# Patient Record
Sex: MALE | URBAN - METROPOLITAN AREA
[De-identification: names, ages, dates, MRNs, and addresses within clinical notes are randomized per-mention and may not be internally consistent; named-entity substitution may affect disease eponyms.]

---

## 2020-12-15 ENCOUNTER — WEB ENCOUNTER (OUTPATIENT)
Dept: URBAN - METROPOLITAN AREA CLINIC 80 | Facility: CLINIC | Age: 65
End: 2020-12-15

## 2020-12-15 ENCOUNTER — OFFICE VISIT (OUTPATIENT)
Dept: URBAN - METROPOLITAN AREA CLINIC 80 | Facility: CLINIC | Age: 65
End: 2020-12-15
Payer: COMMERCIAL

## 2020-12-15 DIAGNOSIS — Z86.19 HX OF HEPATITIS C: ICD-10-CM

## 2020-12-15 DIAGNOSIS — R07.89 OTHER CHEST PAIN: ICD-10-CM

## 2020-12-15 DIAGNOSIS — R79.89 ELEVATED LFTS: ICD-10-CM

## 2020-12-15 PROCEDURE — G8420 CALC BMI NORM PARAMETERS: HCPCS | Performed by: INTERNAL MEDICINE

## 2020-12-15 PROCEDURE — 4004F PT TOBACCO SCREEN RCVD TLK: CPT | Performed by: INTERNAL MEDICINE

## 2020-12-15 PROCEDURE — 99204 OFFICE O/P NEW MOD 45 MIN: CPT | Performed by: INTERNAL MEDICINE

## 2020-12-15 PROCEDURE — G9902 PT SCRN TBCO AND ID AS USER: HCPCS | Performed by: INTERNAL MEDICINE

## 2020-12-15 PROCEDURE — G9906 PT RECV TBCO CESS INTERV: HCPCS | Performed by: INTERNAL MEDICINE

## 2020-12-15 PROCEDURE — G8427 DOCREV CUR MEDS BY ELIG CLIN: HCPCS | Performed by: INTERNAL MEDICINE

## 2020-12-15 RX ORDER — EZETIMIBE 10 MG
1 TABLET TABLET ORAL ONCE A DAY
Status: ACTIVE | COMMUNITY

## 2020-12-15 NOTE — HPI-TODAY'S VISIT:
Very pleasant 65 yr old male with chest pain and  hx of hepatitis C treated with pegasys here for evaluation. He was hospitalized with chest pain the end of November. He had negative cardiac history so he was referred for GI evaluation. The pain began after he ate and was worse when he laid down on his side.  He denies typical GERD symptoms. Denies dysphagia or weight loss.

## 2020-12-19 LAB
ANTI-CENTROMERE B ANTIBODIES: <0.2
ANTI-DNA (DS) AB QN: 9
ANTI-JO-1: <0.2
ANTICHROMATIN ANTIBODIES: <0.2
ANTISCLERODERMA-70 ANTIBODIES: <0.2
HBSAG SCREEN: NEGATIVE
HCV LOG10: 6.38
HEP B CORE AB, IGM: NEGATIVE
HEP BE AG: NEGATIVE
HEPATITIS C GENOTYPE: (no result)
HEPATITIS C QUANTITATION: (no result)
Lab: (no result)
MITOCHONDRIAL (M2) ANTIBODY: <20
RNP ANTIBODIES: 0.2
SJOGREN'S ANTI-SS-A: <0.2
SJOGREN'S ANTI-SS-B: <0.2
SMITH ANTIBODIES: <0.2
TEST INFORMATION:: (no result)

## 2020-12-22 ENCOUNTER — TELEPHONE ENCOUNTER (OUTPATIENT)
Dept: URBAN - METROPOLITAN AREA CLINIC 92 | Facility: CLINIC | Age: 65
End: 2020-12-22

## 2020-12-22 ENCOUNTER — OFFICE VISIT (OUTPATIENT)
Dept: URBAN - METROPOLITAN AREA SURGERY CENTER 19 | Facility: SURGERY CENTER | Age: 65
End: 2020-12-22
Payer: COMMERCIAL

## 2020-12-22 DIAGNOSIS — K29.50 GASTRITIS, CHRONIC: ICD-10-CM

## 2020-12-22 PROCEDURE — 43239 EGD BIOPSY SINGLE/MULTIPLE: CPT | Performed by: INTERNAL MEDICINE

## 2020-12-22 RX ORDER — EZETIMIBE 10 MG
1 TABLET TABLET ORAL ONCE A DAY
Status: ACTIVE | COMMUNITY

## 2020-12-30 ENCOUNTER — OFFICE VISIT (OUTPATIENT)
Dept: URBAN - METROPOLITAN AREA CLINIC 79 | Facility: CLINIC | Age: 65
End: 2020-12-30
Payer: COMMERCIAL

## 2020-12-30 DIAGNOSIS — R74.8 ABNORMAL LIVER ENZYMES: ICD-10-CM

## 2020-12-30 DIAGNOSIS — N28.1 CYST OF RIGHT KIDNEY: ICD-10-CM

## 2020-12-30 PROCEDURE — 76705 ECHO EXAM OF ABDOMEN: CPT | Performed by: INTERNAL MEDICINE

## 2020-12-30 RX ORDER — EZETIMIBE 10 MG
1 TABLET TABLET ORAL ONCE A DAY
Status: ACTIVE | COMMUNITY

## 2021-01-08 ENCOUNTER — TELEPHONE ENCOUNTER (OUTPATIENT)
Dept: URBAN - METROPOLITAN AREA CLINIC 80 | Facility: CLINIC | Age: 66
End: 2021-01-08

## 2021-01-26 ENCOUNTER — OFFICE VISIT (OUTPATIENT)
Dept: URBAN - METROPOLITAN AREA CLINIC 80 | Facility: CLINIC | Age: 66
End: 2021-01-26

## 2021-02-10 ENCOUNTER — OFFICE VISIT (OUTPATIENT)
Dept: URBAN - METROPOLITAN AREA CLINIC 126 | Facility: CLINIC | Age: 66
End: 2021-02-10
Payer: COMMERCIAL

## 2021-02-10 DIAGNOSIS — K21.00 GASTROESOPHAGEAL REFLUX DISEASE WITH ESOPHAGITIS WITHOUT HEMORRHAGE: ICD-10-CM

## 2021-02-10 DIAGNOSIS — K70.30 ALCOHOLIC CIRRHOSIS OF LIVER WITHOUT ASCITES: ICD-10-CM

## 2021-02-10 DIAGNOSIS — B18.2 CHRONIC HEPATITIS C WITHOUT HEPATIC COMA: ICD-10-CM

## 2021-02-10 PROBLEM — 420054005: Status: ACTIVE | Noted: 2021-02-10

## 2021-02-10 PROCEDURE — 99442 PHONE E/M BY PHYS 11-20 MIN: CPT | Performed by: NURSE PRACTITIONER

## 2021-02-10 RX ORDER — EZETIMIBE 10 MG
1 TABLET TABLET ORAL ONCE A DAY
COMMUNITY

## 2021-02-10 RX ORDER — GLECAPREVIR AND PIBRENTASVIR 40; 100 MG/1; MG/1
3 TABLETS TABLET, FILM COATED ORAL ONCE A DAY
Qty: 252 TABLET | Refills: 0 | OUTPATIENT
Start: 2021-02-10 | End: 2021-05-05

## 2021-02-10 NOTE — HPI-TODAY'S VISIT:
Very pleasant 65 yr old seen in follow up today for chest pain and hepatitis C. He had EGD 12/20 notable for reflux esophagitis, no Figueredo's, medeium hiatal hernia, chronic gastritis-no Hpyori. He also returns today to discuss treatment for hepatitis C. He was diagnosed many years ago and was previously treated with Pegasys. At his last visit, labs indicated high viral load and genotype 1a. US with likely cirrhosis. He needs to discuss with PCP to get immunized for Hep A and B.

## 2021-03-08 ENCOUNTER — TELEPHONE ENCOUNTER (OUTPATIENT)
Dept: URBAN - METROPOLITAN AREA CLINIC 2 | Facility: CLINIC | Age: 66
End: 2021-03-08

## 2021-03-23 ENCOUNTER — TELEPHONE ENCOUNTER (OUTPATIENT)
Dept: URBAN - METROPOLITAN AREA CLINIC 2 | Facility: CLINIC | Age: 66
End: 2021-03-23

## 2021-03-23 RX ORDER — LEDIPASVIR AND SOFOSBUVIR 90; 400 MG/1; MG/1
1 TABLET TABLET, FILM COATED ORAL ONCE A DAY
Qty: 84 TABLET | Refills: 0 | OUTPATIENT
Start: 2021-03-24 | End: 2021-06-16

## 2021-03-23 RX ORDER — GLECAPREVIR AND PIBRENTASVIR 40; 100 MG/1; MG/1
3 TABLETS TABLET, FILM COATED ORAL ONCE A DAY
Qty: 252 TABLET | Refills: 0 | Status: ACTIVE | COMMUNITY
Start: 2021-02-10 | End: 2021-05-05

## 2021-03-23 RX ORDER — EZETIMIBE 10 MG
1 TABLET TABLET ORAL ONCE A DAY
COMMUNITY

## 2021-03-24 PROBLEM — 831000119103: Status: ACTIVE | Noted: 2021-03-24

## 2021-03-24 PROBLEM — 19943007: Status: ACTIVE | Noted: 2021-03-24

## 2021-04-02 ENCOUNTER — TELEPHONE ENCOUNTER (OUTPATIENT)
Dept: URBAN - METROPOLITAN AREA CLINIC 126 | Facility: CLINIC | Age: 66
End: 2021-04-02

## 2021-04-08 ENCOUNTER — TELEPHONE ENCOUNTER (OUTPATIENT)
Dept: URBAN - METROPOLITAN AREA CLINIC 2 | Facility: CLINIC | Age: 66
End: 2021-04-08

## 2021-05-04 ENCOUNTER — TELEPHONE ENCOUNTER (OUTPATIENT)
Dept: URBAN - METROPOLITAN AREA CLINIC 23 | Facility: CLINIC | Age: 66
End: 2021-05-04

## 2021-05-05 ENCOUNTER — TELEPHONE ENCOUNTER (OUTPATIENT)
Dept: URBAN - METROPOLITAN AREA CLINIC 92 | Facility: CLINIC | Age: 66
End: 2021-05-05

## 2021-05-05 PROBLEM — 128302006: Status: ACTIVE | Noted: 2021-05-05

## 2021-05-05 RX ORDER — RIBAVIRIN 200 MG/1
3 TABLETS TABLET, FILM COATED ORAL ONCE DAILY
Qty: 168 TABLET | Refills: 0 | OUTPATIENT
Start: 2021-05-05 | End: 2021-06-30

## 2021-05-05 RX ORDER — GLECAPREVIR AND PIBRENTASVIR 40; 100 MG/1; MG/1
3 TABLETS TABLET, FILM COATED ORAL ONCE A DAY
Qty: 252 TABLET | Refills: 0 | Status: ACTIVE | COMMUNITY
Start: 2021-02-10 | End: 2021-05-05

## 2021-05-05 RX ORDER — EZETIMIBE 10 MG
1 TABLET TABLET ORAL ONCE A DAY
COMMUNITY

## 2021-05-05 RX ORDER — LEDIPASVIR AND SOFOSBUVIR 90; 400 MG/1; MG/1
1 TABLET TABLET, FILM COATED ORAL ONCE A DAY
Qty: 84 TABLET | Refills: 0 | Status: ACTIVE | COMMUNITY
Start: 2021-03-24 | End: 2021-06-16

## 2021-05-05 NOTE — HPI-TODAY'S VISIT:
Patient called requesting refill for ribavirin. Spoke to [place] who had forwarded Rx to [facility] since they did not have ribavirin 600mg tabs. pt received 200mg tabs with instructions to take 6 tabs daily.

## 2021-05-12 LAB
A/G RATIO: 1
ALBUMIN: 4.1
ALKALINE PHOSPHATASE: 136
ALT (SGPT): 16
AST (SGOT): 25
BASO (ABSOLUTE): 0
BASOS: 1
BILIRUBIN, TOTAL: 0.8
BUN/CREATININE RATIO: 12
BUN: 9
CALCIUM: 9.8
CARBON DIOXIDE, TOTAL: 24
CHLORIDE: 104
CREATININE: 0.77
EGFR IF AFRICN AM: 112
EGFR IF NONAFRICN AM: 97
EOS (ABSOLUTE): 0.2
EOS: 2
GLOBULIN, TOTAL: 4.1
GLUCOSE: 105
HEMATOCRIT: 41.2
HEMATOLOGY COMMENTS:: (no result)
HEMOGLOBIN: 13.6
IMMATURE CELLS: (no result)
IMMATURE GRANS (ABS): 0
IMMATURE GRANULOCYTES: 0
LYMPHS (ABSOLUTE): 2.8
LYMPHS: 33
MCH: 30.9
MCHC: 33
MCV: 94
MONOCYTES(ABSOLUTE): 0.7
MONOCYTES: 9
NEUTROPHILS (ABSOLUTE): 4.6
NEUTROPHILS: 55
NRBC: (no result)
PLATELETS: 114
POTASSIUM: 4.1
PROTEIN, TOTAL: 8.2
RBC: 4.4
RDW: 12.3
SODIUM: 140
WBC: 8.3

## 2021-05-17 ENCOUNTER — TELEPHONE ENCOUNTER (OUTPATIENT)
Dept: URBAN - METROPOLITAN AREA CLINIC 2 | Facility: CLINIC | Age: 66
End: 2021-05-17

## 2021-05-26 ENCOUNTER — TELEPHONE ENCOUNTER (OUTPATIENT)
Dept: URBAN - METROPOLITAN AREA CLINIC 2 | Facility: CLINIC | Age: 66
End: 2021-05-26

## 2021-05-26 RX ORDER — RIBAVIRIN 200 MG/1
3 TABLETS TABLET, FILM COATED ORAL ONCE DAILY
Qty: 252 TABLET | Refills: 0
Start: 2021-05-05 | End: 2021-08-18

## 2021-05-27 ENCOUNTER — TELEPHONE ENCOUNTER (OUTPATIENT)
Dept: URBAN - METROPOLITAN AREA CLINIC 98 | Facility: CLINIC | Age: 66
End: 2021-05-27

## 2021-07-21 ENCOUNTER — OFFICE VISIT (OUTPATIENT)
Dept: URBAN - METROPOLITAN AREA CLINIC 80 | Facility: CLINIC | Age: 66
End: 2021-07-21
Payer: COMMERCIAL

## 2021-07-21 ENCOUNTER — DASHBOARD ENCOUNTERS (OUTPATIENT)
Age: 66
End: 2021-07-21

## 2021-07-21 DIAGNOSIS — K74.60 CIRRHOSIS OF LIVER WITHOUT ASCITES, UNSPECIFIED HEPATIC CIRRHOSIS TYPE: ICD-10-CM

## 2021-07-21 DIAGNOSIS — B19.20 HEPATITIS C VIRUS INFECTION WITHOUT HEPATIC COMA, UNSPECIFIED CHRONICITY: ICD-10-CM

## 2021-07-21 PROBLEM — 19943007: Status: ACTIVE | Noted: 2021-07-21

## 2021-07-21 PROCEDURE — 99213 OFFICE O/P EST LOW 20 MIN: CPT | Performed by: INTERNAL MEDICINE

## 2021-07-21 RX ORDER — EZETIMIBE 10 MG
1 TABLET TABLET ORAL ONCE A DAY
COMMUNITY

## 2021-07-21 RX ORDER — RIBAVIRIN 200 MG/1
3 TABLETS TABLET, FILM COATED ORAL ONCE DAILY
Qty: 252 TABLET | Refills: 0 | Status: ACTIVE | COMMUNITY
Start: 2021-05-05 | End: 2021-08-18

## 2021-07-21 NOTE — HPI-TODAY'S VISIT:
He comes in today for interval follow up.  He completed treatment for his hepatitis C.  He did well during treatment and has no current complaints.

## 2021-07-23 LAB
A/G RATIO: 1.1
AFP, SERUM: 5.1
AFP-L3%, SERUM: (no result)
ALBUMIN: 4.2
ALKALINE PHOSPHATASE: 107
ALT (SGPT): 13
AST (SGOT): 21
BASO (ABSOLUTE): 0
BASOS: 1
BILIRUBIN, TOTAL: 0.4
BUN/CREATININE RATIO: 14
BUN: 11
CALCIUM: 9.7
CARBON DIOXIDE, TOTAL: 24
CHLORIDE: 102
CREATININE: 0.77
EGFR IF AFRICN AM: 112
EGFR IF NONAFRICN AM: 97
EOS (ABSOLUTE): 0.2
EOS: 3
GLOBULIN, TOTAL: 3.9
GLUCOSE: 101
HCV LOG10: (no result)
HEMATOCRIT: 44.1
HEMATOLOGY COMMENTS:: (no result)
HEMOGLOBIN: 14.1
HEPATITIS C QUANTITATION: (no result)
IMMATURE CELLS: (no result)
IMMATURE GRANS (ABS): 0
IMMATURE GRANULOCYTES: 0
LYMPHS (ABSOLUTE): 2.9
LYMPHS: 35
MCH: 30.5
MCHC: 32
MCV: 95
MONOCYTES(ABSOLUTE): 0.7
MONOCYTES: 8
NEUTROPHILS (ABSOLUTE): 4.6
NEUTROPHILS: 53
NRBC: (no result)
PLATELETS: 130
POTASSIUM: 4
PROTEIN, TOTAL: 8.1
RBC: 4.63
RDW: 13
SODIUM: 139
TEST INFORMATION:: (no result)
WBC: 8.4